# Patient Record
Sex: MALE | Race: WHITE | Employment: FULL TIME | URBAN - NONMETROPOLITAN AREA
[De-identification: names, ages, dates, MRNs, and addresses within clinical notes are randomized per-mention and may not be internally consistent; named-entity substitution may affect disease eponyms.]

---

## 2022-08-31 ENCOUNTER — HOSPITAL ENCOUNTER (EMERGENCY)
Age: 57
Discharge: HOME | End: 2022-08-31
Payer: COMMERCIAL

## 2022-08-31 ENCOUNTER — APPOINTMENT (OUTPATIENT)
Dept: GENERAL RADIOLOGY | Age: 57
End: 2022-08-31
Payer: COMMERCIAL

## 2022-08-31 VITALS
RESPIRATION RATE: 16 BRPM | HEART RATE: 63 BPM | OXYGEN SATURATION: 99 % | BODY MASS INDEX: 24.5 KG/M2 | WEIGHT: 175 LBS | SYSTOLIC BLOOD PRESSURE: 106 MMHG | HEIGHT: 71 IN | DIASTOLIC BLOOD PRESSURE: 44 MMHG

## 2022-08-31 DIAGNOSIS — S61.215A LACERATION OF LEFT RING FINGER WITHOUT FOREIGN BODY WITHOUT DAMAGE TO NAIL, INITIAL ENCOUNTER: Primary | ICD-10-CM

## 2022-08-31 PROCEDURE — 6370000000 HC RX 637 (ALT 250 FOR IP): Performed by: PHYSICIAN ASSISTANT

## 2022-08-31 PROCEDURE — 99283 EMERGENCY DEPT VISIT LOW MDM: CPT

## 2022-08-31 PROCEDURE — 73130 X-RAY EXAM OF HAND: CPT

## 2022-08-31 RX ADMIN — Medication 3 ML: at 17:04

## 2022-08-31 RX ADMIN — GELATIN ABSORBABLE SPONGE 12-7 MM 1 EACH: 12-7 MISC at 18:15

## 2022-08-31 ASSESSMENT — PAIN DESCRIPTION - LOCATION: LOCATION: HAND

## 2022-08-31 ASSESSMENT — PAIN DESCRIPTION - ORIENTATION: ORIENTATION: RIGHT

## 2022-08-31 ASSESSMENT — PAIN - FUNCTIONAL ASSESSMENT: PAIN_FUNCTIONAL_ASSESSMENT: 0-10

## 2022-08-31 ASSESSMENT — PAIN DESCRIPTION - DESCRIPTORS: DESCRIPTORS: THROBBING

## 2022-08-31 ASSESSMENT — PAIN SCALES - GENERAL: PAINLEVEL_OUTOF10: 6

## 2022-08-31 NOTE — ED PROVIDER NOTES
[facility] ED  eMERGENCY dEPARTMENT eNCOUnter      Pt Name: Clarisse Meigs  MRN: [id number]  [place] 1965  Date of evaluation: 8/31/2022  Provider: Bertha Ackerman PA-C    CHIEF COMPLAINT       Chief Complaint   Patient presents with    Hand Injury     Pt report he accidentally cut the tip of his left ring finger off with a utility knife PTA           HISTORY OF PRESENT ILLNESS  (Location/Symptom, Timing/Onset, Context/Setting, Quality, Duration, Modifying Factors, Severity.)   Clarisse Meigs is a 60 y.o. male who presents to the emergency department c/o laceration to left ring finger. States they were using a  and accidentally slipped cutting his left ring finger completely avulsing. Denies any numbness or tingling. Denies any other complaints. Up to date on tetanus: unsure       Nursing Notes were reviewed. REVIEW OF SYSTEMS    (2-9 systems for level 4, 10 or more for level 5)     Review of Systems   Laceration to left ring finger    Except as noted above the remainder of the review of systems was reviewed and negative. PAST MEDICAL HISTORY   History reviewed. No pertinent past medical history. None otherwise stated in nurses notes    SURGICAL HISTORY       Past Surgical History:   Procedure Laterality Date    ANKLE SURGERY Left     TONSILLECTOMY      WRIST SURGERY       None otherwise stated in nurses notes    CURRENT MEDICATIONS       There are no discharge medications for this patient. ALLERGIES     Patient has no known allergies. FAMILY HISTORY     History reviewed. No pertinent family history. No family status information on file. None otherwise stated in nurses notes    SOCIAL HISTORY      reports that he has never smoked.  He has never used smokeless tobacco.   lives at home with others     PHYSICAL EXAM    (up to 7 for level 4, 8 or more for level 5)     ED Triage Vitals [08/31/22 1550]   BP Temp Temp src Heart Rate Resp SpO2 Height Weight   (!) 149/70 -- -- 63 16 99 % 5' 11\" (1.803 m) 175 lb (79.4 kg)       Physical Exam   Nursing note and vitals reviewed. Constitutional: Oriented to person, place, and time and well-developed, well-nourished. Head: Normocephalic and atraumatic. Ear: External ears normal.   Nose: Nose normal and midline. Eyes: Conjunctivae and EOM are normal. Pupils are equal, round, and reactive to light. Neck: Normal range of motion. Cardiovascular: Normal rate, regular rhythm, normal heart sounds and intact distal pulses. Pulmonary/Chest: Effort normal and breath sounds normal. No respiratory distress. No wheezes. No rales. No chest tenderness. Musculoskeletal: Normal range of motion. Normal range of motion of affected extremity as his laceration is distal  Neurological: Alert and oriented to person, place, and time. GCS score is 15. Skin: Skin is warm and dry. No rash noted. No erythema. No pallor. There is avulsion type laceration to the tip of the left ring finger, there is no nail involvement, there is no exposed bone, there is minimal active bleeding at this time  Psychiatric: Mood, memory, affect and judgment normal.           DIAGNOSTIC RESULTS         RADIOLOGY:   All plain film, CT, MRI, and formal ultrasound images (except ED bedside ultrasound) are read by the radiologist  XR HAND LEFT (MIN 3 VIEWS)   Final Result   No acute osseous abnormality. XR HAND LEFT (MIN 3 VIEWS)    Result Date: 8/31/2022  EXAMINATION: THREE XRAY VIEWS OF THE LEFT HAND 8/31/2022 4:17 pm COMPARISON: None. HISTORY: Acute pain. FINDINGS: Osteopenia. No acute fracture or dislocation. No significant osteoarthrosis. Soft tissues are unremarkable. No acute osseous abnormality. LABS:  Labs Reviewed - No data to display    All other labs were within normal range or not returned as of this dictation.     EMERGENCY DEPARTMENT COURSE and DIFFERENTIAL DIAGNOSIS/MDM:   Vitals:    Vitals:    08/31/22 1550 08/31/22 1615 08/31/22 1630 08/31/22 1631   BP: (!) 149/70 (!) 98/52 (!) 106/44    Pulse: 63      Resp: 16      SpO2: 99%  (!) 82% 99%   Weight: 175 lb (79.4 kg)      Height: 5' 11\" (1.803 m)              PROCEDURES:  Laceration Repair Procedure Note    Nonstick sterile pressure dressing was applied patient follow-up with family doctor          Wound care instructions given instructed to return for signs of infection including redness, swelling, fevers chills, increased pain, red streaking, pus drainage. ED MEDS:  Orders Placed This Encounter   Medications    lidocaine-EPINEPHrine-tetracaine (LET) topical solution 3 mL syringe    gelatin adsorbable (GELFOAM) sponge 1 each         CONSULTS:  None          FINAL IMPRESSION      1. Laceration of left ring finger without foreign body without damage to nail, initial encounter          DISPOSITION/PLAN   DISPOSITION Decision To Discharge    PATIENT REFERRED TO:  Luda Diane DO  [address]  [phone]    Schedule an appointment as soon as possible for a visit       [address]  [phone]    For worsening symptoms, or any other concern    DISCHARGE MEDICATIONS:  There are no discharge medications for this patient.       (Please note that portions of this note were completed with a voice recognition program.  Efforts were made to edit the dictations but occasionally words are mis-transcribed.)    OLMAN Baca PA-C  08/31/22 2046